# Patient Record
Sex: FEMALE | Race: BLACK OR AFRICAN AMERICAN | NOT HISPANIC OR LATINO | Employment: UNEMPLOYED | URBAN - METROPOLITAN AREA
[De-identification: names, ages, dates, MRNs, and addresses within clinical notes are randomized per-mention and may not be internally consistent; named-entity substitution may affect disease eponyms.]

---

## 2023-03-27 ENCOUNTER — TELEPHONE (OUTPATIENT)
Dept: PRIMARY CARE | Facility: CLINIC | Age: 33
End: 2023-03-27
Payer: COMMERCIAL

## 2023-03-27 NOTE — TELEPHONE ENCOUNTER
Patient states Zoloft may need dose increased as she is still anxious. Also wants you to know it is curbing her appetite where she is not eating enough for herself and breastfeeding.   Calm

## 2023-03-29 PROBLEM — R87.610 ASCUS WITH POSITIVE HIGH RISK HPV CERVICAL: Status: ACTIVE | Noted: 2023-03-29

## 2023-03-29 PROBLEM — R00.2 PALPITATIONS: Status: RESOLVED | Noted: 2023-03-29 | Resolved: 2023-03-29

## 2023-03-29 PROBLEM — R14.0 BLOATING: Status: RESOLVED | Noted: 2023-03-29 | Resolved: 2023-03-29

## 2023-03-29 PROBLEM — N72 HIGH RISK HUMAN PAPILLOMA VIRUS (HPV) INFECTION OF CERVIX: Status: ACTIVE | Noted: 2023-03-29

## 2023-03-29 PROBLEM — R87.810 PAPANICOLAOU SMEAR OF CERVIX WITH POSITIVE HIGH RISK HUMAN PAPILLOMA VIRUS (HPV) TEST: Status: ACTIVE | Noted: 2023-03-29

## 2023-03-29 PROBLEM — B37.31 VAGINITIS DUE TO CANDIDA: Status: RESOLVED | Noted: 2023-03-29 | Resolved: 2023-03-29

## 2023-03-29 PROBLEM — K59.00 CONSTIPATION, ACUTE: Status: RESOLVED | Noted: 2023-03-29 | Resolved: 2023-03-29

## 2023-03-29 PROBLEM — R87.810 ASCUS WITH POSITIVE HIGH RISK HPV CERVICAL: Status: RESOLVED | Noted: 2023-03-29 | Resolved: 2023-03-29

## 2023-03-29 PROBLEM — R87.810 ASCUS WITH POSITIVE HIGH RISK HPV CERVICAL: Status: ACTIVE | Noted: 2023-03-29

## 2023-03-29 PROBLEM — F41.9 ANXIETY AND DEPRESSION: Status: ACTIVE | Noted: 2023-03-29

## 2023-03-29 PROBLEM — B96.89 BACTERIAL VAGINOSIS: Status: RESOLVED | Noted: 2023-03-29 | Resolved: 2023-03-29

## 2023-03-29 PROBLEM — F17.210 CIGARETTE SMOKER: Status: ACTIVE | Noted: 2023-03-29

## 2023-03-29 PROBLEM — N39.0 URINARY TRACT INFECTION: Status: RESOLVED | Noted: 2023-03-29 | Resolved: 2023-03-29

## 2023-03-29 PROBLEM — N76.0 BACTERIAL VAGINOSIS: Status: RESOLVED | Noted: 2023-03-29 | Resolved: 2023-03-29

## 2023-03-29 PROBLEM — T63.481A ALLERGIC REACTION TO INSECT STING: Status: RESOLVED | Noted: 2023-03-29 | Resolved: 2023-03-29

## 2023-03-29 PROBLEM — F32.A ANXIETY AND DEPRESSION: Status: ACTIVE | Noted: 2023-03-29

## 2023-03-29 PROBLEM — N89.8 LEUKORRHEA: Status: RESOLVED | Noted: 2023-03-29 | Resolved: 2023-03-29

## 2023-03-29 PROBLEM — R87.612 LOW GRADE SQUAMOUS INTRAEPITHELIAL LESION (LGSIL) ON CERVICAL PAP SMEAR: Status: ACTIVE | Noted: 2023-03-29

## 2023-03-29 PROBLEM — R87.610 ASCUS WITH POSITIVE HIGH RISK HPV CERVICAL: Status: RESOLVED | Noted: 2023-03-29 | Resolved: 2023-03-29

## 2023-03-29 PROBLEM — B97.7 HIGH RISK HUMAN PAPILLOMA VIRUS (HPV) INFECTION OF CERVIX: Status: ACTIVE | Noted: 2023-03-29

## 2023-03-29 PROBLEM — R51.9 BILATERAL HEADACHES: Status: RESOLVED | Noted: 2023-03-29 | Resolved: 2023-03-29

## 2023-03-29 RX ORDER — CLINDAMYCIN PHOSPHATE 10 UG/ML
1 LOTION TOPICAL 2 TIMES DAILY
COMMUNITY
Start: 2022-12-14

## 2023-03-29 RX ORDER — TRIAMCINOLONE ACETONIDE 1 MG/G
1 OINTMENT TOPICAL 2 TIMES WEEKLY
COMMUNITY
Start: 2022-12-13

## 2023-03-29 RX ORDER — POLYETHYLENE GLYCOL 3350 17 G/17G
17 POWDER, FOR SOLUTION ORAL DAILY PRN
COMMUNITY
Start: 2021-10-13 | End: 2023-05-03 | Stop reason: ALTCHOICE

## 2023-03-29 RX ORDER — SERTRALINE HYDROCHLORIDE 50 MG/1
1 TABLET, FILM COATED ORAL DAILY
COMMUNITY
Start: 2023-03-01 | End: 2023-04-03 | Stop reason: DRUGHIGH

## 2023-03-30 ENCOUNTER — APPOINTMENT (OUTPATIENT)
Dept: PRIMARY CARE | Facility: CLINIC | Age: 33
End: 2023-03-30
Payer: COMMERCIAL

## 2023-04-02 NOTE — PROGRESS NOTES
Subjective   Patient ID: Griselda Little is a 32 y.o. female who presents for Follow-up (Follow up on medication.).    Here for 4 week follow up on Anxiety and depression. Last seen 3/1/23. Increased Sertraline to 50 mg daily.   Here for reevaluation. Patient is breastfeeding. Hair is coming out. She is getting headaches. Has had them for the last week or so. Baby is 1 yr old. No rapid heartbeats or diarrhea. She is having more issues with anxiety, gets upset easily, worrying about stuff that is unlikely. It gets out of control fast. Is still breastfeeding. She is weaning off currently. She would like to try going up first on the Sertraline first. No suicidal thoughts.   Weight 115 in Jan, 108 in Feb. Normal baseline weight is 120. Had TFTs in January.     Non smoker, never did.     Has had issues with bladder since delivered last year. Feels like she has to urinate all the time, but does not empty all the way. She will have a BM every 3 days then seems like her bladder works better for a day or so. Feels like neither bladder nor bowel have worked right since delivery one year ago. Had vaginal delivery without perineal laceration.              Current Outpatient Medications:     clindamycin (Cleocin T) 1 % lotion, Apply 1 Application topically in the morning and 1 Application before bedtime., Disp: , Rfl:     polyethylene glycol (Glycolax) 17 gram/dose powder, Take 17 g by mouth once daily as needed., Disp: , Rfl:     sertraline (Zoloft) 50 mg tablet, Take 1 tablet (50 mg) by mouth once daily., Disp: , Rfl:     triamcinolone (Kenalog) 0.1 % ointment, Apply 1 Application topically 2 times a week. To scalp, Disp: , Rfl:     Patient Active Problem List   Diagnosis    High risk human papilloma virus (HPV) infection of cervix    Low grade squamous intraepithelial lesion (LGSIL) on cervical Pap smear    Papanicolaou smear of cervix with positive high risk human papilloma virus (HPV) test    Anxiety and depression    Urgency  "incontinence         Review of Systems   Neurological:  Negative for dizziness.   Psychiatric/Behavioral:  Positive for decreased concentration and sleep disturbance. Negative for agitation, behavioral problems and suicidal ideas. The patient is nervous/anxious.        Objective   /78 (BP Location: Left arm, Patient Position: Sitting, BP Cuff Size: Adult)   Pulse 96   Temp 36.4 °C (97.6 °F)   Ht 1.575 m (5' 2\")   Wt 47.9 kg (105 lb 9.6 oz)   SpO2 96%   BMI 19.31 kg/m²     Physical Exam  Vitals reviewed.   Constitutional:       Appearance: Normal appearance.   Skin:     General: Skin is warm and dry.   Neurological:      Mental Status: She is alert and oriented to person, place, and time.   Psychiatric:         Behavior: Behavior normal.         Thought Content: Thought content normal.         Judgment: Judgment normal.      Comments: Mildly anxious. She is having no flight of ideas.          Assessment/Plan   Problem List Items Addressed This Visit          Genitourinary    Urgency incontinence - Primary     Difficulty voiding alternating with urge incontinence since delivered one year ago. Referred to Dr. Davis.          Relevant Orders    Referral to Urogynecology         Assessment, plans, tests, and follow up discussed with patient and patient verbalized understanding. Griselda was given an opportunity to ask questions and  any concerns were addressed including but not limited to referral, med changes..  "

## 2023-04-03 ENCOUNTER — OFFICE VISIT (OUTPATIENT)
Dept: PRIMARY CARE | Facility: CLINIC | Age: 33
End: 2023-04-03
Payer: COMMERCIAL

## 2023-04-03 VITALS
TEMPERATURE: 97.6 F | HEART RATE: 96 BPM | BODY MASS INDEX: 19.43 KG/M2 | DIASTOLIC BLOOD PRESSURE: 78 MMHG | OXYGEN SATURATION: 96 % | SYSTOLIC BLOOD PRESSURE: 110 MMHG | HEIGHT: 62 IN | WEIGHT: 105.6 LBS

## 2023-04-03 DIAGNOSIS — F32.A ANXIETY AND DEPRESSION: Primary | ICD-10-CM

## 2023-04-03 DIAGNOSIS — N39.41 URGENCY INCONTINENCE: ICD-10-CM

## 2023-04-03 DIAGNOSIS — F41.9 ANXIETY AND DEPRESSION: Primary | ICD-10-CM

## 2023-04-03 PROCEDURE — 99214 OFFICE O/P EST MOD 30 MIN: CPT | Performed by: FAMILY MEDICINE

## 2023-04-03 RX ORDER — SERTRALINE HYDROCHLORIDE 100 MG/1
100 TABLET, FILM COATED ORAL DAILY
Qty: 30 TABLET | Refills: 1 | Status: SHIPPED | OUTPATIENT
Start: 2023-04-03 | End: 2023-05-03 | Stop reason: SDUPTHER

## 2023-04-03 ASSESSMENT — ANXIETY QUESTIONNAIRES
7. FEELING AFRAID AS IF SOMETHING AWFUL MIGHT HAPPEN: SEVERAL DAYS
6. BECOMING EASILY ANNOYED OR IRRITABLE: NOT AT ALL
5. BEING SO RESTLESS THAT IT IS HARD TO SIT STILL: MORE THAN HALF THE DAYS
4. TROUBLE RELAXING: NEARLY EVERY DAY
3. WORRYING TOO MUCH ABOUT DIFFERENT THINGS: MORE THAN HALF THE DAYS
2. NOT BEING ABLE TO STOP OR CONTROL WORRYING: MORE THAN HALF THE DAYS
1. FEELING NERVOUS, ANXIOUS, OR ON EDGE: SEVERAL DAYS
IF YOU CHECKED OFF ANY PROBLEMS ON THIS QUESTIONNAIRE, HOW DIFFICULT HAVE THESE PROBLEMS MADE IT FOR YOU TO DO YOUR WORK, TAKE CARE OF THINGS AT HOME, OR GET ALONG WITH OTHER PEOPLE: SOMEWHAT DIFFICULT
GAD7 TOTAL SCORE: 11

## 2023-04-03 ASSESSMENT — ENCOUNTER SYMPTOMS
AGITATION: 0
DECREASED CONCENTRATION: 1
DIZZINESS: 0
NERVOUS/ANXIOUS: 1
SLEEP DISTURBANCE: 1
DEPRESSION: 0
OCCASIONAL FEELINGS OF UNSTEADINESS: 0
LOSS OF SENSATION IN FEET: 0

## 2023-04-03 ASSESSMENT — COLUMBIA-SUICIDE SEVERITY RATING SCALE - C-SSRS
1. IN THE PAST MONTH, HAVE YOU WISHED YOU WERE DEAD OR WISHED YOU COULD GO TO SLEEP AND NOT WAKE UP?: NO
6. HAVE YOU EVER DONE ANYTHING, STARTED TO DO ANYTHING, OR PREPARED TO DO ANYTHING TO END YOUR LIFE?: NO
2. HAVE YOU ACTUALLY HAD ANY THOUGHTS OF KILLING YOURSELF?: NO

## 2023-04-03 ASSESSMENT — PATIENT HEALTH QUESTIONNAIRE - PHQ9
1. LITTLE INTEREST OR PLEASURE IN DOING THINGS: NOT AT ALL
SUM OF ALL RESPONSES TO PHQ9 QUESTIONS 1 AND 2: 0
2. FEELING DOWN, DEPRESSED OR HOPELESS: NOT AT ALL

## 2023-04-03 NOTE — ASSESSMENT & PLAN NOTE
Difficulty voiding alternating with urge incontinence since delivered one year ago. Referred to Dr. Davis.

## 2023-04-03 NOTE — ASSESSMENT & PLAN NOTE
Increased anxiety. Still Breastfeeding. Despite weight loss concerns, she would like to try increasing Sertraline as not totally weaned baby yet. Will increase. Follow up one months. If still losing weight, may need GI evaluation

## 2023-05-03 ENCOUNTER — OFFICE VISIT (OUTPATIENT)
Dept: PRIMARY CARE | Facility: CLINIC | Age: 33
End: 2023-05-03
Payer: COMMERCIAL

## 2023-05-03 VITALS
TEMPERATURE: 98 F | OXYGEN SATURATION: 98 % | HEIGHT: 62 IN | BODY MASS INDEX: 19.91 KG/M2 | HEART RATE: 96 BPM | RESPIRATION RATE: 20 BRPM | WEIGHT: 108.2 LBS | DIASTOLIC BLOOD PRESSURE: 69 MMHG | SYSTOLIC BLOOD PRESSURE: 100 MMHG

## 2023-05-03 DIAGNOSIS — N39.41 URGENCY INCONTINENCE: ICD-10-CM

## 2023-05-03 DIAGNOSIS — F41.9 ANXIETY AND DEPRESSION: ICD-10-CM

## 2023-05-03 DIAGNOSIS — N72 HIGH RISK HUMAN PAPILLOMA VIRUS (HPV) INFECTION OF CERVIX: ICD-10-CM

## 2023-05-03 DIAGNOSIS — Z11.59 ENCOUNTER FOR HEPATITIS C SCREENING TEST FOR LOW RISK PATIENT: ICD-10-CM

## 2023-05-03 DIAGNOSIS — Z00.00 WELL ADULT EXAM: Primary | ICD-10-CM

## 2023-05-03 DIAGNOSIS — B97.7 HIGH RISK HUMAN PAPILLOMA VIRUS (HPV) INFECTION OF CERVIX: ICD-10-CM

## 2023-05-03 DIAGNOSIS — R87.612 LOW GRADE SQUAMOUS INTRAEPITHELIAL LESION (LGSIL) ON CERVICAL PAP SMEAR: ICD-10-CM

## 2023-05-03 DIAGNOSIS — F32.A ANXIETY AND DEPRESSION: ICD-10-CM

## 2023-05-03 DIAGNOSIS — Z13.1 SCREENING FOR DIABETES MELLITUS: ICD-10-CM

## 2023-05-03 DIAGNOSIS — Z23 NEED FOR VACCINATION: ICD-10-CM

## 2023-05-03 DIAGNOSIS — Z13.220 SCREENING, LIPID: ICD-10-CM

## 2023-05-03 DIAGNOSIS — R63.4 WEIGHT LOSS: ICD-10-CM

## 2023-05-03 PROCEDURE — 99395 PREV VISIT EST AGE 18-39: CPT | Performed by: FAMILY MEDICINE

## 2023-05-03 PROCEDURE — 99213 OFFICE O/P EST LOW 20 MIN: CPT | Performed by: FAMILY MEDICINE

## 2023-05-03 RX ORDER — KETOCONAZOLE 20 MG/ML
SHAMPOO, SUSPENSION TOPICAL
COMMUNITY
Start: 2023-04-19

## 2023-05-03 RX ORDER — SERTRALINE HYDROCHLORIDE 100 MG/1
100 TABLET, FILM COATED ORAL DAILY
Qty: 90 TABLET | Refills: 3 | Status: SHIPPED | OUTPATIENT
Start: 2023-05-03 | End: 2023-08-04 | Stop reason: SDUPTHER

## 2023-05-03 ASSESSMENT — ENCOUNTER SYMPTOMS
APPETITE CHANGE: 0
NERVOUS/ANXIOUS: 0
POLYDIPSIA: 0
TROUBLE SWALLOWING: 0
HEADACHES: 0
WHEEZING: 0
ABDOMINAL PAIN: 0
CONSTIPATION: 0
DIFFICULTY URINATING: 0
NAUSEA: 0
SEIZURES: 0
CONFUSION: 0
COUGH: 0
BLOOD IN STOOL: 0
SHORTNESS OF BREATH: 0
ACTIVITY CHANGE: 0
DYSPHORIC MOOD: 0
FATIGUE: 0
UNEXPECTED WEIGHT CHANGE: 0
PALPITATIONS: 0
ARTHRALGIAS: 0
POLYPHAGIA: 0
VOMITING: 0
DIARRHEA: 0
JOINT SWELLING: 0

## 2023-05-03 NOTE — ASSESSMENT & PLAN NOTE
TFTs were normal. Increased Sertraline to see if controlling depression would help. If weight loss continues, will need to see GI.

## 2023-05-03 NOTE — ASSESSMENT & PLAN NOTE
Sertraline increased to 100 mg daily on  4/3/23. Concentration and motivation have increased. Continue Sertraline 100 mg daily. Recheck 6 months.

## 2023-05-03 NOTE — ASSESSMENT & PLAN NOTE
On Pap July 2022. Colpo August 2022 was negative. Repeat Pap/HPV due Aug 2022. Is scheduled with Dr. Pal

## 2023-05-03 NOTE — PROGRESS NOTES
Subjective   Patient ID: Griselda Little is a 32 y.o. female who presents for Annual Exam (1 month follow up).     HPI    Here for physical and to follow up from visit one month ago. Was seen for anx/depression, weight loss and urge incontinence issues    Anxiety/depression - was still having issues with concentration and motivation on Sertraline 50 mg daily. Increased to 100 mg last visit. Is doing better and symptoms and function have improved. No side effects. No SI, hallucinations or delusions. No agitation. Sleep ok. She ie eating more sugary foods and wonders if it could be a symptom. Mind is racing at night still and does not settle down. She is thinking maybe changing habits will help. She has not totally weaned her daughter and it makes her anxious. She is able to interact better with others and is less edgy in social setting.     Weight loss - had lost 10 lb unintentionally since Aug 2022. Weight 105 last month. Was to follow up to see if weight stabilized with mood. TFTs done January 2023.     Urge Incontinence - sx started after deivery one year prior and also having some fecal issues. Referred to Dr. Davis, has appt scheduled on 5/9/23.  She is having less problem than before but still is issue. Has some mucous when has BM.     LSIL with positive HRHPV. - July 2022, colpo Aug 2022. Dr. Pal. Is scheduled for repeat July 2023.     Has had HPV immunizations times 2 at age 21, due for number 3, [facility].    Here for annual wellness exam. Last wellness unknown.     New concerns:no  Feels: well    Changes  to health/medications since last visit No   Other providers seen since last visit none  Periods: not done breast feeding completely. LMP Feb 2023, denies pregnancy  Contraception:no    Healthy lifestyle habits: Regular exercise: not a smuch lately.                                         Dietary habits: healthy diet                                         Social connections/relationships good                                        Wears seatbelts Yes                                         Sunscreen Yes                                         Sexual Risk Factors No        Health screenings:  Pap smear: 7/18/22 HPV pos, followed by colpo repeat due July 2023                                  Mammogram not applicable                                  Self-breast Exam No                                   Colon screening not applicable                                  Dexa not applicable                                  Hep C screening No                                   HIV screening  9/14/2021                                  STI screening March 2022                          Health risks: Domestic Violence no                      Work-life balance Yes  - just got a job last week as a .                       Smoke detectors Yes                       Carbon monoxide detectors yes                      Gun safety not applicable                      Second-hand Smoke No                       Occupational Risks no    Immunizations:  Influenza:  No                             Tdap:  12/23/21                            HPV:  times 2, due for number 3                           Pneumonia: not applicable                           Shingrix: not applicable                           COVID: No           Current Outpatient Medications:     clindamycin (Cleocin T) 1 % lotion, Apply 1 Application topically 2 times a day., Disp: , Rfl:     ketoconazole (NIZOral) 2 % shampoo, apply to scalp every week LEAVE ON SCALP FOR 10-15 MINUTES THEN R...  (REFER TO PRESCRIPTION NOTES)., Disp: , Rfl:     sertraline (Zoloft) 100 mg tablet, Take 1 tablet (100 mg) by mouth once daily., Disp: 30 tablet, Rfl: 1    triamcinolone (Kenalog) 0.1 % ointment, Apply 1 Application topically 2 times a week. To scalp, Disp: , Rfl:     Patient Active Problem List   Diagnosis    High risk human papilloma virus  "(HPV) infection of cervix    Low grade squamous intraepithelial lesion (LGSIL) on cervical Pap smear    Anxiety and depression    Urgency incontinence    Weight loss         Review of Systems   Constitutional:  Negative for activity change, appetite change, fatigue and unexpected weight change.   HENT:  Negative for dental problem, hearing loss and trouble swallowing.    Eyes:  Negative for visual disturbance.   Respiratory:  Negative for cough, shortness of breath and wheezing.    Cardiovascular:  Negative for chest pain, palpitations and leg swelling.   Gastrointestinal:  Negative for abdominal pain, blood in stool, constipation, diarrhea, nausea and vomiting.   Endocrine: Negative for cold intolerance, heat intolerance, polydipsia, polyphagia and polyuria.   Genitourinary:  Negative for difficulty urinating and menstrual problem.   Musculoskeletal:  Negative for arthralgias and joint swelling.   Neurological:  Negative for seizures, syncope and headaches.   Psychiatric/Behavioral:  Negative for confusion and dysphoric mood. The patient is not nervous/anxious.        Objective   /69   Pulse 96   Temp 36.7 °C (98 °F)   Resp 20   Ht 1.575 m (5' 2\")   Wt 49.1 kg (108 lb 3.2 oz)   SpO2 98%   BMI 19.79 kg/m²     Physical Exam  Constitutional:       Appearance: Normal appearance.   HENT:      Head: Normocephalic and atraumatic.      Right Ear: Tympanic membrane normal.      Left Ear: Tympanic membrane normal.      Nose: Nose normal.      Mouth/Throat:      Pharynx: Oropharynx is clear.   Eyes:      Extraocular Movements: Extraocular movements intact.      Conjunctiva/sclera: Conjunctivae normal.      Pupils: Pupils are equal, round, and reactive to light.   Neck:      Vascular: No carotid bruit.   Cardiovascular:      Rate and Rhythm: Normal rate and regular rhythm.      Pulses: Normal pulses.      Heart sounds: No murmur heard.  Pulmonary:      Effort: Pulmonary effort is normal.      Breath sounds: Normal " breath sounds.   Abdominal:      Palpations: There is no hepatomegaly, splenomegaly or mass.      Tenderness: There is no abdominal tenderness.   Musculoskeletal:         General: Normal range of motion.      Cervical back: Normal range of motion.      Left lower leg: No edema.   Skin:     General: Skin is warm and dry.      Findings: No rash.   Neurological:      General: No focal deficit present.      Mental Status: She is alert. Mental status is at baseline.      Gait: Gait is intact.   Psychiatric:         Mood and Affect: Mood normal.         Thought Content: Thought content normal.       Assessment/Plan   Problem List Items Addressed This Visit          Genitourinary    High risk human papilloma virus (HPV) infection of cervix     On Pap July 2022. Colpo August 2022 was negative. Repeat Pap/HPV due Aug 2022. Is scheduled with Dr. Pal         Urgency incontinence     Issues with urine and stool since delivery. Has appt with urogynecology Dr. Davis on 5/9/23            Endocrine/Metabolic    Weight loss     TFTs were normal. Increased Sertraline to see if controlling depression would help. If weight loss continues, will need to see GI.          Relevant Orders    Comprehensive Metabolic Panel    CBC and Auto Differential       Other    Low grade squamous intraepithelial lesion (LGSIL) on cervical Pap smear     Scheduled for repeat Pap Dr. Pal July 2023.          Anxiety and depression     Sertraline increased to 100 mg daily on  4/3/23. Concentration and motivation have increased. Continue Sertraline 100 mg daily. Recheck 6 months.           Other Visit Diagnoses       Well adult exam    -  Primary    Discussed prevention from age, healthy lifestyle, immunizations.    Encounter for hepatitis C screening test for low risk patient        ordered    Relevant Orders    Hepatitis C Antibody    Need for vaccination        Flu shot in fall, Needs HPV3, needs to get at pharmacy per insurance.    Screening for  diabetes mellitus        Relevant Orders    Hemoglobin A1C    Screening, lipid        Relevant Orders    Lipid Panel              Assessment, plans, tests, and follow up discussed with patient and patient verbalized understanding. Griselda was given an opportunity to ask questions and  any concerns were addressed including but not limited to medications, follow up .

## 2023-08-03 ENCOUNTER — APPOINTMENT (OUTPATIENT)
Dept: PRIMARY CARE | Facility: CLINIC | Age: 33
End: 2023-08-03
Payer: COMMERCIAL

## 2023-08-04 ENCOUNTER — OFFICE VISIT (OUTPATIENT)
Dept: PRIMARY CARE | Facility: CLINIC | Age: 33
End: 2023-08-04
Payer: COMMERCIAL

## 2023-08-04 ENCOUNTER — LAB (OUTPATIENT)
Dept: LAB | Facility: LAB | Age: 33
End: 2023-08-04
Payer: COMMERCIAL

## 2023-08-04 VITALS
HEART RATE: 74 BPM | DIASTOLIC BLOOD PRESSURE: 70 MMHG | HEIGHT: 62 IN | RESPIRATION RATE: 16 BRPM | WEIGHT: 112.4 LBS | TEMPERATURE: 98.2 F | OXYGEN SATURATION: 97 % | SYSTOLIC BLOOD PRESSURE: 104 MMHG | BODY MASS INDEX: 20.68 KG/M2

## 2023-08-04 DIAGNOSIS — F32.A ANXIETY AND DEPRESSION: Primary | ICD-10-CM

## 2023-08-04 DIAGNOSIS — Z13.1 SCREENING FOR DIABETES MELLITUS: ICD-10-CM

## 2023-08-04 DIAGNOSIS — R63.4 WEIGHT LOSS: ICD-10-CM

## 2023-08-04 DIAGNOSIS — F41.9 ANXIETY AND DEPRESSION: Primary | ICD-10-CM

## 2023-08-04 DIAGNOSIS — Z11.59 ENCOUNTER FOR HEPATITIS C SCREENING TEST FOR LOW RISK PATIENT: ICD-10-CM

## 2023-08-04 DIAGNOSIS — Z13.220 SCREENING, LIPID: ICD-10-CM

## 2023-08-04 LAB
ALANINE AMINOTRANSFERASE (SGPT) (U/L) IN SER/PLAS: 20 U/L (ref 7–45)
ALBUMIN (G/DL) IN SER/PLAS: 4.2 G/DL (ref 3.4–5)
ALKALINE PHOSPHATASE (U/L) IN SER/PLAS: 65 U/L (ref 33–110)
ANION GAP IN SER/PLAS: 9 MMOL/L (ref 10–20)
ASPARTATE AMINOTRANSFERASE (SGOT) (U/L) IN SER/PLAS: 19 U/L (ref 9–39)
BASOPHILS (10*3/UL) IN BLOOD BY AUTOMATED COUNT: 0.06 X10E9/L (ref 0–0.1)
BASOPHILS/100 LEUKOCYTES IN BLOOD BY AUTOMATED COUNT: 1.3 % (ref 0–2)
BILIRUBIN TOTAL (MG/DL) IN SER/PLAS: 0.9 MG/DL (ref 0–1.2)
CALCIUM (MG/DL) IN SER/PLAS: 8.9 MG/DL (ref 8.6–10.3)
CARBON DIOXIDE, TOTAL (MMOL/L) IN SER/PLAS: 28 MMOL/L (ref 21–32)
CHLORIDE (MMOL/L) IN SER/PLAS: 107 MMOL/L (ref 98–107)
CHOLESTEROL (MG/DL) IN SER/PLAS: 164 MG/DL (ref 0–199)
CHOLESTEROL IN HDL (MG/DL) IN SER/PLAS: 66.4 MG/DL
CHOLESTEROL/HDL RATIO: 2.5
CREATININE (MG/DL) IN SER/PLAS: 0.6 MG/DL (ref 0.5–1.05)
EOSINOPHILS (10*3/UL) IN BLOOD BY AUTOMATED COUNT: 0.13 X10E9/L (ref 0–0.7)
EOSINOPHILS/100 LEUKOCYTES IN BLOOD BY AUTOMATED COUNT: 2.8 % (ref 0–6)
ERYTHROCYTE DISTRIBUTION WIDTH (RATIO) BY AUTOMATED COUNT: 12.1 % (ref 11.5–14.5)
ERYTHROCYTE MEAN CORPUSCULAR HEMOGLOBIN CONCENTRATION (G/DL) BY AUTOMATED: 32.3 G/DL (ref 32–36)
ERYTHROCYTE MEAN CORPUSCULAR VOLUME (FL) BY AUTOMATED COUNT: 90 FL (ref 80–100)
ERYTHROCYTES (10*6/UL) IN BLOOD BY AUTOMATED COUNT: 4.43 X10E12/L (ref 4–5.2)
ESTIMATED AVERAGE GLUCOSE FOR HBA1C: 105 MG/DL
GFR FEMALE: >90 ML/MIN/1.73M2
GLUCOSE (MG/DL) IN SER/PLAS: 83 MG/DL (ref 74–99)
HEMATOCRIT (%) IN BLOOD BY AUTOMATED COUNT: 39.9 % (ref 36–46)
HEMOGLOBIN (G/DL) IN BLOOD: 12.9 G/DL (ref 12–16)
HEMOGLOBIN A1C/HEMOGLOBIN TOTAL IN BLOOD: 5.3 %
HEPATITIS C VIRUS AB PRESENCE IN SERUM: NONREACTIVE
IMMATURE GRANULOCYTES/100 LEUKOCYTES IN BLOOD BY AUTOMATED COUNT: 0.2 % (ref 0–0.9)
LDL: 87 MG/DL (ref 0–99)
LEUKOCYTES (10*3/UL) IN BLOOD BY AUTOMATED COUNT: 4.7 X10E9/L (ref 4.4–11.3)
LYMPHOCYTES (10*3/UL) IN BLOOD BY AUTOMATED COUNT: 2.47 X10E9/L (ref 1.2–4.8)
LYMPHOCYTES/100 LEUKOCYTES IN BLOOD BY AUTOMATED COUNT: 52.9 % (ref 13–44)
MONOCYTES (10*3/UL) IN BLOOD BY AUTOMATED COUNT: 0.32 X10E9/L (ref 0.1–1)
MONOCYTES/100 LEUKOCYTES IN BLOOD BY AUTOMATED COUNT: 6.9 % (ref 2–10)
NEUTROPHILS (10*3/UL) IN BLOOD BY AUTOMATED COUNT: 1.68 X10E9/L (ref 1.2–7.7)
NEUTROPHILS/100 LEUKOCYTES IN BLOOD BY AUTOMATED COUNT: 35.9 % (ref 40–80)
PLATELETS (10*3/UL) IN BLOOD AUTOMATED COUNT: 258 X10E9/L (ref 150–450)
POTASSIUM (MMOL/L) IN SER/PLAS: 4.1 MMOL/L (ref 3.5–5.3)
PROTEIN TOTAL: 6.6 G/DL (ref 6.4–8.2)
SODIUM (MMOL/L) IN SER/PLAS: 140 MMOL/L (ref 136–145)
TRIGLYCERIDE (MG/DL) IN SER/PLAS: 51 MG/DL (ref 0–149)
UREA NITROGEN (MG/DL) IN SER/PLAS: 12 MG/DL (ref 6–23)
VLDL: 10 MG/DL (ref 0–40)

## 2023-08-04 PROCEDURE — 80061 LIPID PANEL: CPT

## 2023-08-04 PROCEDURE — 83036 HEMOGLOBIN GLYCOSYLATED A1C: CPT

## 2023-08-04 PROCEDURE — 99213 OFFICE O/P EST LOW 20 MIN: CPT | Performed by: FAMILY MEDICINE

## 2023-08-04 PROCEDURE — 36415 COLL VENOUS BLD VENIPUNCTURE: CPT

## 2023-08-04 PROCEDURE — 86803 HEPATITIS C AB TEST: CPT

## 2023-08-04 PROCEDURE — 85025 COMPLETE CBC W/AUTO DIFF WBC: CPT

## 2023-08-04 PROCEDURE — 80053 COMPREHEN METABOLIC PANEL: CPT

## 2023-08-04 RX ORDER — SERTRALINE HYDROCHLORIDE 100 MG/1
150 TABLET, FILM COATED ORAL DAILY
Qty: 135 TABLET | Refills: 3 | Status: SHIPPED | OUTPATIENT
Start: 2023-08-04 | End: 2024-08-03

## 2023-08-04 ASSESSMENT — COLUMBIA-SUICIDE SEVERITY RATING SCALE - C-SSRS
2. HAVE YOU ACTUALLY HAD ANY THOUGHTS OF KILLING YOURSELF?: NO
1. IN THE PAST MONTH, HAVE YOU WISHED YOU WERE DEAD OR WISHED YOU COULD GO TO SLEEP AND NOT WAKE UP?: NO
6. HAVE YOU EVER DONE ANYTHING, STARTED TO DO ANYTHING, OR PREPARED TO DO ANYTHING TO END YOUR LIFE?: NO

## 2023-08-04 ASSESSMENT — ENCOUNTER SYMPTOMS
DEPRESSION: 0
LOSS OF SENSATION IN FEET: 0
OCCASIONAL FEELINGS OF UNSTEADINESS: 0

## 2023-08-04 NOTE — PATIENT INSTRUCTIONS
Increased Sertraline to 150 mg daily.     Follow up in 3 weeks. Bring baby shot records.     Get IEP info and check with your school to see what the requirements are for IEP.

## 2023-08-04 NOTE — ASSESSMENT & PLAN NOTE
Increased it to 100 and helped some. She still has trouble going to sleep at night. Wonders if needs higher. Mind will not shut down at night. Is still Breastfeeding but trying to wean off.

## 2023-08-04 NOTE — PROGRESS NOTES
"Subjective   Patient ID: Griselda Little is a 32 y.o. female who presents for Follow-up.    Here for follow up. Sertaline 100 mg helped some but feels needs higher dose due to thoughts racing at night. 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   No SI. Depression is improved but intermittently stressed by money issues.     She had IEP in school and wants to know if can do that. She does not know what the school requires.     Had labs done today.            Current Outpatient Medications:     clindamycin (Cleocin T) 1 % lotion, Apply 1 Application topically 2 times a day., Disp: , Rfl:     ketoconazole (NIZOral) 2 % shampoo, apply to scalp every week LEAVE ON SCALP FOR 10-15 MINUTES THEN R...  (REFER TO PRESCRIPTION NOTES)., Disp: , Rfl:     triamcinolone (Kenalog) 0.1 % ointment, Apply 1 Application topically 2 times a week. To scalp, Disp: , Rfl:     sertraline (Zoloft) 100 mg tablet, Take 1.5 tablets (150 mg) by mouth once daily., Disp: 135 tablet, Rfl: 3    Patient Active Problem List   Diagnosis    High risk human papilloma virus (HPV) infection of cervix    Low grade squamous intraepithelial lesion (LGSIL) on cervical Pap smear    Anxiety and depression    Urgency incontinence    Weight loss         Review of Systems    Objective   /70 (BP Location: Left arm, Patient Position: Sitting, BP Cuff Size: Adult)   Pulse 74   Temp 36.8 °C (98.2 °F)   Resp 16   Ht 1.575 m (5' 2\")   Wt 51 kg (112 lb 6.4 oz)   SpO2 97%   BMI 20.56 kg/m²     Physical Exam  Vitals reviewed.   Constitutional:       Appearance: Normal appearance. She is normal weight.   Pulmonary:      Effort: Pulmonary effort is normal.   Skin:     General: Skin is warm and dry.   Neurological:      Mental Status: She is alert.   Psychiatric:         Mood and Affect: Mood normal.         Behavior: Behavior normal.         Assessment/Plan   Problem List Items Addressed This Visit       Anxiety and depression - Primary     Increased it to " 100 and helped some. She still has trouble going to sleep at night. Wonders if needs higher. Mind will not shut down at night. Is still Breastfeeding but trying to wean off.          Relevant Medications    sertraline (Zoloft) 100 mg tablet    Weight loss     Weight up 4 lbs.               Assessment, plans, tests, and follow up discussed with patient and patient verbalized understanding. Griselda was given an opportunity to ask questions and  any concerns were addressed including but not limited to followup, need for immunization records, med changes. .

## 2023-08-09 ENCOUNTER — TELEPHONE (OUTPATIENT)
Dept: PRIMARY CARE | Facility: CLINIC | Age: 33
End: 2023-08-09
Payer: COMMERCIAL

## 2023-08-09 DIAGNOSIS — Z11.1 SCREENING EXAMINATION FOR PULMONARY TUBERCULOSIS: Primary | ICD-10-CM

## 2023-08-09 NOTE — TELEPHONE ENCOUNTER
Patient called in and she actually starts school on Monday and she needed a TB shot. Can we set that up as a nurse visit?

## 2023-08-11 NOTE — TELEPHONE ENCOUNTER
Left message with patient that she cannot get that here due to timing and availability. Suggested she go to  today so can get read on Sunday.   Also offered to do T-spot. Put in order in case she wants it. Would want to get drawn today to maybe be back on Monday.

## 2023-08-16 ENCOUNTER — TELEPHONE (OUTPATIENT)
Dept: PRIMARY CARE | Facility: CLINIC | Age: 33
End: 2023-08-16
Payer: COMMERCIAL

## 2023-08-17 ENCOUNTER — LAB (OUTPATIENT)
Dept: LAB | Facility: LAB | Age: 33
End: 2023-08-17
Payer: COMMERCIAL

## 2023-08-17 ENCOUNTER — TELEPHONE (OUTPATIENT)
Dept: PRIMARY CARE | Facility: CLINIC | Age: 33
End: 2023-08-17

## 2023-08-17 DIAGNOSIS — Z11.59 SCREENING FOR VIRAL DISEASE: Primary | ICD-10-CM

## 2023-08-17 DIAGNOSIS — Z11.1 SCREENING EXAMINATION FOR PULMONARY TUBERCULOSIS: ICD-10-CM

## 2023-08-17 NOTE — TELEPHONE ENCOUNTER
She needs MMR, Varicella and Hep B titers. They are ordered  Also, cannot do the PPD but the blood test for TB is accepatable per the forms she dropped off, and order in chart.   Forms will be completed once labs back.     She has another message about IEP letter. She was supposed to provide me with documentation for her ADD diagnosis. Does she have it?    Also, I cannot write a letter without knowing the exact accommodations she is rewuesting. She needs to find out what they want and if they have forms.

## 2023-08-17 NOTE — TELEPHONE ENCOUNTER
Patient is requesting a titers lab to be ordered so she is able to see what vaccinations she needs.

## 2023-08-22 ENCOUNTER — LAB (OUTPATIENT)
Dept: LAB | Facility: LAB | Age: 33
End: 2023-08-22
Payer: COMMERCIAL

## 2023-08-22 DIAGNOSIS — Z11.59 SCREENING FOR VIRAL DISEASE: ICD-10-CM

## 2023-08-22 PROCEDURE — 86706 HEP B SURFACE ANTIBODY: CPT

## 2023-08-22 PROCEDURE — 36415 COLL VENOUS BLD VENIPUNCTURE: CPT

## 2023-08-22 PROCEDURE — 86735 MUMPS ANTIBODY: CPT

## 2023-08-22 PROCEDURE — 86762 RUBELLA ANTIBODY: CPT

## 2023-08-22 PROCEDURE — 86765 RUBEOLA ANTIBODY: CPT

## 2023-08-22 PROCEDURE — 86787 VARICELLA-ZOSTER ANTIBODY: CPT

## 2023-08-23 LAB
HEPATITIS B VIRUS SURFACE AB (MIU/ML) IN SERUM: <3.1 MIU/ML
MUMPS IGG ANTIBODY: POSITIVE
RUBELLA VIRUS IGG AB: POSITIVE
RUBEOLA IGG ANTIBODY: POSITIVE
VARICELLA ZOSTER IGG: POSITIVE

## 2023-08-24 ENCOUNTER — TELEPHONE (OUTPATIENT)
Dept: PRIMARY CARE | Facility: CLINIC | Age: 33
End: 2023-08-24
Payer: COMMERCIAL

## 2023-08-25 ENCOUNTER — APPOINTMENT (OUTPATIENT)
Dept: PRIMARY CARE | Facility: CLINIC | Age: 33
End: 2023-08-25
Payer: COMMERCIAL

## 2023-08-25 RX ORDER — LORATADINE 10 MG/1
10 TABLET ORAL DAILY
COMMUNITY
Start: 2023-08-17

## 2023-08-25 ASSESSMENT — ENCOUNTER SYMPTOMS
AGITATION: 0
NERVOUS/ANXIOUS: 0
DECREASED CONCENTRATION: 0
HEADACHES: 0
DYSPHORIC MOOD: 0
NAUSEA: 0

## 2023-08-25 NOTE — PROGRESS NOTES
Subjective   Patient ID: Griselda Little is a 32 y.o. female who presents for No chief complaint on file..    HPI       Current Outpatient Medications:     loratadine (Claritin) 10 mg tablet, Take 1 tablet (10 mg) by mouth once daily., Disp: , Rfl:     clindamycin (Cleocin T) 1 % lotion, Apply 1 Application topically 2 times a day., Disp: , Rfl:     ketoconazole (NIZOral) 2 % shampoo, apply to scalp every week LEAVE ON SCALP FOR 10-15 MINUTES THEN R...  (REFER TO PRESCRIPTION NOTES)., Disp: , Rfl:     sertraline (Zoloft) 100 mg tablet, Take 1.5 tablets (150 mg) by mouth once daily., Disp: 135 tablet, Rfl: 3    triamcinolone (Kenalog) 0.1 % ointment, Apply 1 Application topically 2 times a week. To scalp, Disp: , Rfl:     Patient Active Problem List   Diagnosis    High risk human papilloma virus (HPV) infection of cervix    Low grade squamous intraepithelial lesion (LGSIL) on cervical Pap smear    Anxiety and depression    Urgency incontinence    Weight loss         Review of Systems   Gastrointestinal:  Negative for nausea.   Neurological:  Negative for headaches.   Psychiatric/Behavioral:  Negative for agitation, behavioral problems, decreased concentration and dysphoric mood. The patient is not nervous/anxious.        Objective   There were no vitals taken for this visit.    Physical Exam  Vitals reviewed.   Constitutional:       Appearance: Normal appearance.   Pulmonary:      Effort: Pulmonary effort is normal.   Neurological:      Mental Status: She is alert.   Psychiatric:         Mood and Affect: Mood normal.         Behavior: Behavior normal.       Recent Results (from the past 672 hour(s))   CYTOLOGY GYN RESULTS    Collection Time: 07/28/23 12:02 PM   Result Value Ref Range    Pathology Report           Accession #: [id number]     Date of Procedure:  7/28/2023       Pathologist: [facility], Cytology  Date Reported: 8/9/2023  Date Received:  7/28/2023  Submitting Physician: ELA  MD NATHAN                    FINAL CYTOLOGICAL INTERPRETATION        A.  THINPREP PAP CERVICAL:              Specimen adequacy:       SATISFACTORY FOR EVALUATION.       Quality Indicator: Endocervical/transformation zone component is present.              General Categorization:       NEGATIVE FOR INTRAEPITHELIAL LESION OR MALIGNANCY.                            HIGH RISK HPV TEST RESULT:                       HPV GENOTYPE  16                      NEGATIVE       HPV GENOTYPE  18                      NEGATIVE       HPV GENOTYPE  OTHER             NEGATIVE              Reference Range: Negative                  Slide(s) initially screened by a Cytotechnologist at [facility], [address] [address]  QC review performed at [facility], [place], operated by [facility], [address] [place] [address]  Testing for high-risk (HR) type of human papilloma virus (HPV) is performed by  the Roche yonny HPV Test.  The yonny HPV Test is a qualitative polymerase chain  reaction that amplifies DNA of HPV16, HPV18 and 12 other high-risk HPV types  (31, 33, 35, 39, 45, 51, 52, 56, 58, 59, 66, and 68) associated with cervical  cancer and its precursor lesions.  A positive result indicates the presence of  HPV DNA due to one or more of the 14 genotypes: 16, 18, 31, 33, 35, 39, 45, 51,  52, 56, 58, 59, 66, and 68. Negative results indicate HPV DNA concentrations  are undetectable or below the pre-set threshold for detection. False negative  results may be associated with unoptimized sampling. A negative HR HPV result  does not exclude the possibility of future cytologic HSIL or underlying CIN2-3  or cancer.    This test is approved for cervical specimens by  the [place] Food and Drug  Administration. Results of this test should be interprete d in conjunction with  the patient’s Pap test results.  Please refer to ASCCP current guidelines for  the use of HPV DNA testing, result interpretation,  and patient management.   The performance of this test was verified by the Molecular Diagnostic  Laboratory at [facility]. The lab is  certified under the Clinical Laboratory Amendments of 1988 (CLIA 88) as  qualified to perform high complexity clinical laboratory testing.    This specimen has been analyzed by the ScienionPrep Imaging System ([employer]  an automated imaging and review system, which assists the laboratory in  evaluating cells on ThinPrep Pap tests. Following automated imaging, selected  fields from every slide were reviewed by a cytotechnologist and/or pathologist.      Electronically Signed Out By [facility],  Cytology//MXG/SLD   By the signature on this report, the individual or group listed as making the  Final Interpretation/Diagnosis certifies that they ha ve reviewed this case.  Diagnostic interpretation performed at [address] [address]  Educational Note:  Cervical cytology is a screening procedure primarily for squamous cancers and  precursors and has associated false-negative and false-positive results as  evidenced by published data.  Your patient’s test should be interpreted in this  context, together with patient’s history and clinical findings.  Regular  sampling and follow-up of unexplained clinical signs and symptoms are  recommended to minimize false negative results.         Clinical History  Date of Last Menstrual Period:     unknown    Other Clinical Conditions:  HPV Test for All Interpretations - Include HPV Genotype    Clinical Diagnosis History: High risk HPV infection - (B97.7); Low grade  squamous intraepithelial lesion (LGSIL) on cervical Pap smear - (R87.612)   Source of Specimen  A: THINPREP PAP CERVICAL            [facility]  Department of  Pathology   [address] [place] [id number]       Hepatitis C Antibody    Collection Time:  08/04/23 11:01 AM   Result Value Ref Range    Hepatitis C Ab NONREACTIVE NONREACTIVE   Lipid Panel    Collection Time: 08/04/23 11:01 AM   Result Value Ref Range    Cholesterol 164 0 - 199 mg/dL    HDL 66.4 mg/dL    Cholesterol/HDL Ratio 2.5     LDL 87 0 - 99 mg/dL    VLDL 10 0 - 40 mg/dL    Triglycerides 51 0 - 149 mg/dL   Hemoglobin A1C    Collection Time: 08/04/23 11:01 AM   Result Value Ref Range    Hemoglobin A1C 5.3 %    Estimated Average Glucose 105 MG/DL   Comprehensive Metabolic Panel    Collection Time: 08/04/23 11:01 AM   Result Value Ref Range    Glucose 83 74 - 99 mg/dL    Sodium 140 136 - 145 mmol/L    Potassium 4.1 3.5 - 5.3 mmol/L    Chloride 107 98 - 107 mmol/L    Bicarbonate 28 21 - 32 mmol/L    Anion Gap 9 (L) 10 - 20 mmol/L    Urea Nitrogen 12 6 - 23 mg/dL    Creatinine 0.60 0.50 - 1.05 mg/dL    GFR Female >90 >90 mL/min/1.73m2    Calcium 8.9 8.6 - 10.3 mg/dL    Albumin 4.2 3.4 - 5.0 g/dL    Alkaline Phosphatase 65 33 - 110 U/L    Total Protein 6.6 6.4 - 8.2 g/dL    AST 19 9 - 39 U/L    Total Bilirubin 0.9 0.0 - 1.2 mg/dL    ALT (SGPT) 20 7 - 45 U/L   CBC and Auto Differential    Collection Time: 08/04/23 11:01 AM   Result Value Ref Range    WBC 4.7 4.4 - 11.3 x10E9/L    RBC 4.43 4.00 - 5.20 x10E12/L    Hemoglobin 12.9 12.0 - 16.0 g/dL    Hematocrit 39.9 36.0 - 46.0 %    MCV 90 80 - 100 fL    MCHC 32.3 32.0 - 36.0 g/dL    Platelets 258 150 - 450 x10E9/L    RDW 12.1 11.5 - 14.5 %    Neutrophils % 35.9 40.0 - 80.0 %    Immature Granulocytes %, Automated 0.2 0.0 - 0.9 %    Lymphocytes % 52.9 13.0 - 44.0 %    Monocytes % 6.9 2.0 - 10.0 %    Eosinophils % 2.8 0.0 - 6.0 %    Basophils % 1.3 0.0 - 2.0 %    Neutrophils Absolute 1.68 1.20 - 7.70 x10E9/L    Lymphocytes Absolute 2.47 1.20 - 4.80 x10E9/L    Monocytes Absolute 0.32 0.10 - 1.00 x10E9/L    Eosinophils Absolute 0.13 0.00 - 0.70 x10E9/L    Basophils Absolute 0.06 0.00 - 0.10 x10E9/L   Measles (Rubeola) Antibody, IgG    Collection Time: 08/22/23  10:27 AM   Result Value Ref Range    Rubeola IgG POSITIVE    Rubella antibody, IgG    Collection Time: 08/22/23 10:27 AM   Result Value Ref Range    Rubella IgG POSITIVE    Mumps Antibody, IgG    Collection Time: 08/22/23 10:27 AM   Result Value Ref Range    Mumps IgG POSITIVE    Varicella zoster antibody, IgG    Collection Time: 08/22/23 10:27 AM   Result Value Ref Range    Varicella IgG POSITIVE NEGATIVE   Hepatitis B Surface Antibody    Collection Time: 08/22/23 10:27 AM   Result Value Ref Range    Hepatitis B Surface Ab <3.1 <10 mIU/mL         Assessment/Plan   Problem List Items Addressed This Visit       Anxiety and depression - Primary     Sertraline increased to 100 mg last visit. Had been having issues with concentration.           Other Visit Diagnoses       Need for vaccination                  Assessment, plans, tests, and follow up discussed with patient and patient verbalized understanding. Griselda was given an opportunity to ask questions and  any concerns were addressed including but not limited to ***.

## 2023-08-28 ENCOUNTER — APPOINTMENT (OUTPATIENT)
Dept: PRIMARY CARE | Facility: CLINIC | Age: 33
End: 2023-08-28
Payer: COMMERCIAL

## 2023-09-11 ENCOUNTER — APPOINTMENT (OUTPATIENT)
Dept: PRIMARY CARE | Facility: CLINIC | Age: 33
End: 2023-09-11
Payer: COMMERCIAL

## 2023-09-28 ENCOUNTER — PATIENT MESSAGE (OUTPATIENT)
Dept: PRIMARY CARE | Facility: CLINIC | Age: 33
End: 2023-09-28
Payer: COMMERCIAL

## 2023-10-16 ENCOUNTER — APPOINTMENT (OUTPATIENT)
Dept: PRIMARY CARE | Facility: CLINIC | Age: 33
End: 2023-10-16
Payer: COMMERCIAL

## 2024-01-18 ENCOUNTER — PATIENT MESSAGE (OUTPATIENT)
Dept: PRIMARY CARE | Facility: CLINIC | Age: 34
End: 2024-01-18
Payer: COMMERCIAL

## 2024-05-23 ENCOUNTER — APPOINTMENT (OUTPATIENT)
Dept: PRIMARY CARE | Facility: CLINIC | Age: 34
End: 2024-05-23
Payer: COMMERCIAL

## 2024-06-13 ENCOUNTER — APPOINTMENT (OUTPATIENT)
Dept: PRIMARY CARE | Facility: CLINIC | Age: 34
End: 2024-06-13
Payer: COMMERCIAL

## 2024-08-02 ENCOUNTER — APPOINTMENT (OUTPATIENT)
Dept: OBSTETRICS AND GYNECOLOGY | Facility: CLINIC | Age: 34
End: 2024-08-02
Payer: COMMERCIAL

## 2024-08-02 VITALS
HEIGHT: 62 IN | BODY MASS INDEX: 23 KG/M2 | SYSTOLIC BLOOD PRESSURE: 98 MMHG | WEIGHT: 125 LBS | DIASTOLIC BLOOD PRESSURE: 60 MMHG

## 2024-08-02 DIAGNOSIS — Z11.3 SCREEN FOR STD (SEXUALLY TRANSMITTED DISEASE): ICD-10-CM

## 2024-08-02 DIAGNOSIS — Z01.411 ENCOUNTER FOR GYNECOLOGICAL EXAMINATION WITH ABNORMAL FINDING: Primary | ICD-10-CM

## 2024-08-02 DIAGNOSIS — N94.6 DYSMENORRHEA: ICD-10-CM

## 2024-08-02 DIAGNOSIS — N92.0 MENORRHAGIA WITH REGULAR CYCLE: ICD-10-CM

## 2024-08-02 PROBLEM — N39.41 URGENCY INCONTINENCE: Status: RESOLVED | Noted: 2023-04-03 | Resolved: 2024-08-02

## 2024-08-02 PROBLEM — R87.612 LOW GRADE SQUAMOUS INTRAEPITHELIAL LESION (LGSIL) ON CERVICAL PAP SMEAR: Status: RESOLVED | Noted: 2023-03-29 | Resolved: 2024-08-02

## 2024-08-02 PROBLEM — B97.7 HIGH RISK HUMAN PAPILLOMA VIRUS (HPV) INFECTION OF CERVIX: Status: RESOLVED | Noted: 2023-03-29 | Resolved: 2024-08-02

## 2024-08-02 PROBLEM — N72 HIGH RISK HUMAN PAPILLOMA VIRUS (HPV) INFECTION OF CERVIX: Status: RESOLVED | Noted: 2023-03-29 | Resolved: 2024-08-02

## 2024-08-02 PROCEDURE — 99395 PREV VISIT EST AGE 18-39: CPT | Performed by: OBSTETRICS & GYNECOLOGY

## 2024-08-02 PROCEDURE — 87661 TRICHOMONAS VAGINALIS AMPLIF: CPT

## 2024-08-02 PROCEDURE — 3008F BODY MASS INDEX DOCD: CPT | Performed by: OBSTETRICS & GYNECOLOGY

## 2024-08-02 PROCEDURE — 87491 CHLMYD TRACH DNA AMP PROBE: CPT

## 2024-08-02 PROCEDURE — 1036F TOBACCO NON-USER: CPT | Performed by: OBSTETRICS & GYNECOLOGY

## 2024-08-02 PROCEDURE — 87591 N.GONORRHOEAE DNA AMP PROB: CPT

## 2024-08-02 NOTE — PROGRESS NOTES
Griselda Little is a 33 y.o. female who is here for a routine exam. PCP = Madalyn Thibodeaux MD  Desires STD check  APE Concerns: Periods are longer and heavier and more painful    Other Concerns: none  Preventative:  Seat Belt Use: always    GynHx:  Periods are regular every 28-30 days, lasting 7 days.  Dysmenorrhea: moderate, occurring first 1-2 days of flow.   Cyclic symptoms include none.    Social History     Substance and Sexual Activity   Sexual Activity Not Currently    Birth control/protection: None     Current contraception: None  STIs: none  Last PAP: 2023, negative      SoHx:  Substance:   Tobacco Use: Low Risk  (8/2/2024)    Patient History     Smoking Tobacco Use: Never     Smokeless Tobacco Use: Never     Passive Exposure: Not on file      Social History     Substance and Sexual Activity   Drug Use Never      Social History     Substance and Sexual Activity   Alcohol Use Never       Past Medical History:   Diagnosis Date    Abnormal uterine and vaginal bleeding, unspecified 11/07/2014    Abnormal bleeding in menstrual cycle    Allergic reaction to insect sting 03/29/2023    Anemia complicating pregnancy, second trimester ([facility]) 12/23/2021    Anemia of mother in pregnancy, antepartum, second trimester    Anemia complicating pregnancy, third trimester ([facility]) 03/10/2022    Anemia during pregnancy in third trimester    ASCUS with positive high risk HPV cervical 03/29/2023    Atypical squamous cells of undetermined significance on cytologic smear of cervix (ASC-US)     ASCUS on Pap smear    Bacterial vaginosis 03/29/2023    Bilateral headaches 03/29/2023    Bloating 03/29/2023    Low grade squamous intraepithelial lesion (LGSIL) on cervical Pap smear 03/29/2023    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 No visible thyromegaly.    Breast/Axilla: Normal to palpation bilaterally without masses, skin changes, or nipple discharge.    Abdomen: Soft, non-tender, without masses or organomegaly   Vulva: Normal architecture without erythema, masses, or lesions.    Vagina: Normal mucosa without lesions, masses, or atrophy. No abnormal vaginal discharge.    Cervix: Normal without masses, lesions, or signs of cervicitis.    Uterus: Normal mobile, non-enlarged uterus    Adnexa: Normal without masses or lesions   Pelvic Floor No POP noted. No high tone pelvic floor    Psych Normal affect. Normal mood.      Problem List Items Addressed This Visit       Dysmenorrhea    Encounter for gynecological examination with abnormal finding - Primary    Menorrhagia with regular cycle      Assessment/Plan    Thank you for coming to your annual exam. Your findings during the exam were normal. Specific topics addressed during this exam included: healthy lifestyle, well woman screening guidelines,  Healthy diet with high fiber, Weight bearing exercise 3 to 5 times a week, Multivitamins and calcium     Actions performed during this visit include:  - Clinical breast exam  - Clinical pelvic exam  - GC/CT /testing ordered  - Pelvic ultrasound ordered       Please return for your next visit in 1 year.  "

## 2024-08-03 LAB
C TRACH RRNA SPEC QL NAA+PROBE: NEGATIVE
N GONORRHOEA DNA SPEC QL PROBE+SIG AMP: NEGATIVE
T VAGINALIS RRNA SPEC QL NAA+PROBE: NEGATIVE

## 2024-08-16 ENCOUNTER — HOSPITAL ENCOUNTER (OUTPATIENT)
Dept: RADIOLOGY | Facility: CLINIC | Age: 34
Discharge: HOME | End: 2024-08-16
Payer: COMMERCIAL

## 2024-08-16 DIAGNOSIS — N94.6 DYSMENORRHEA: ICD-10-CM

## 2024-08-16 DIAGNOSIS — N92.0 MENORRHAGIA WITH REGULAR CYCLE: ICD-10-CM

## 2024-08-16 PROCEDURE — 76856 US EXAM PELVIC COMPLETE: CPT

## 2024-08-26 ENCOUNTER — APPOINTMENT (OUTPATIENT)
Dept: PRIMARY CARE | Facility: CLINIC | Age: 34
End: 2024-08-26
Payer: COMMERCIAL

## 2024-08-26 ENCOUNTER — LAB (OUTPATIENT)
Dept: LAB | Facility: LAB | Age: 34
End: 2024-08-26
Payer: COMMERCIAL

## 2024-08-26 VITALS
DIASTOLIC BLOOD PRESSURE: 78 MMHG | SYSTOLIC BLOOD PRESSURE: 117 MMHG | OXYGEN SATURATION: 96 % | WEIGHT: 126.2 LBS | RESPIRATION RATE: 16 BRPM | BODY MASS INDEX: 23.22 KG/M2 | HEIGHT: 62 IN | HEART RATE: 86 BPM | TEMPERATURE: 97.7 F

## 2024-08-26 DIAGNOSIS — Z00.00 WELL ADULT EXAM: ICD-10-CM

## 2024-08-26 DIAGNOSIS — Z13.1 SCREENING FOR DIABETES MELLITUS: ICD-10-CM

## 2024-08-26 DIAGNOSIS — F41.1 GAD (GENERALIZED ANXIETY DISORDER): Primary | ICD-10-CM

## 2024-08-26 DIAGNOSIS — Z23 NEED FOR VACCINATION: ICD-10-CM

## 2024-08-26 PROBLEM — R87.619 ABNORMAL CERVICAL PAPANICOLAOU SMEAR: Status: ACTIVE | Noted: 2023-03-29

## 2024-08-26 PROBLEM — R63.4 WEIGHT LOSS: Status: RESOLVED | Noted: 2023-05-03 | Resolved: 2024-08-26

## 2024-08-26 PROBLEM — R87.612 LOW GRADE SQUAMOUS INTRAEPITHELIAL LESION (LGSIL) ON PAPANICOLAOU SMEAR OF CERVIX: Status: RESOLVED | Noted: 2024-08-26 | Resolved: 2024-08-26

## 2024-08-26 LAB
ANION GAP SERPL CALC-SCNC: 10 MMOL/L (ref 10–20)
BUN SERPL-MCNC: 9 MG/DL (ref 6–23)
CALCIUM SERPL-MCNC: 9.5 MG/DL (ref 8.6–10.3)
CHLORIDE SERPL-SCNC: 104 MMOL/L (ref 98–107)
CO2 SERPL-SCNC: 29 MMOL/L (ref 21–32)
CREAT SERPL-MCNC: 0.65 MG/DL (ref 0.5–1.05)
EGFRCR SERPLBLD CKD-EPI 2021: >90 ML/MIN/1.73M*2
EST. AVERAGE GLUCOSE BLD GHB EST-MCNC: 105 MG/DL
GLUCOSE SERPL-MCNC: 70 MG/DL (ref 74–99)
HBA1C MFR BLD: 5.3 %
POTASSIUM SERPL-SCNC: 4.2 MMOL/L (ref 3.5–5.3)
SODIUM SERPL-SCNC: 139 MMOL/L (ref 136–145)

## 2024-08-26 PROCEDURE — 80048 BASIC METABOLIC PNL TOTAL CA: CPT

## 2024-08-26 PROCEDURE — 99395 PREV VISIT EST AGE 18-39: CPT | Performed by: FAMILY MEDICINE

## 2024-08-26 PROCEDURE — 3008F BODY MASS INDEX DOCD: CPT | Performed by: FAMILY MEDICINE

## 2024-08-26 PROCEDURE — 1036F TOBACCO NON-USER: CPT | Performed by: FAMILY MEDICINE

## 2024-08-26 PROCEDURE — 36415 COLL VENOUS BLD VENIPUNCTURE: CPT

## 2024-08-26 PROCEDURE — 83036 HEMOGLOBIN GLYCOSYLATED A1C: CPT

## 2024-08-26 RX ORDER — BUSPIRONE HYDROCHLORIDE 15 MG/1
15 TABLET ORAL 3 TIMES DAILY PRN
Qty: 90 TABLET | Refills: 1 | Status: SHIPPED | OUTPATIENT
Start: 2024-08-26 | End: 2024-10-25

## 2024-08-26 RX ORDER — ESCITALOPRAM OXALATE 10 MG/1
10 TABLET ORAL DAILY
Qty: 30 TABLET | Refills: 1 | Status: SHIPPED | OUTPATIENT
Start: 2024-08-26 | End: 2024-10-25

## 2024-08-26 ASSESSMENT — ENCOUNTER SYMPTOMS
CONSTIPATION: 1
WHEEZING: 0
ACTIVITY CHANGE: 0
DIARRHEA: 0
JOINT SWELLING: 0
COUGH: 0
POLYDIPSIA: 0
UNEXPECTED WEIGHT CHANGE: 0
HEADACHES: 0
AGITATION: 0
TROUBLE SWALLOWING: 0
DIFFICULTY URINATING: 0
CONFUSION: 0
SHORTNESS OF BREATH: 0
DEPRESSION: 0
VOMITING: 0
SLEEP DISTURBANCE: 1
LOSS OF SENSATION IN FEET: 0
ABDOMINAL PAIN: 0
DECREASED CONCENTRATION: 1
DYSPHORIC MOOD: 0
OCCASIONAL FEELINGS OF UNSTEADINESS: 0
NERVOUS/ANXIOUS: 1
FATIGUE: 0
ARTHRALGIAS: 0
APPETITE CHANGE: 0
PALPITATIONS: 0
HALLUCINATIONS: 0
NAUSEA: 0
BLOOD IN STOOL: 0
POLYPHAGIA: 0
SEIZURES: 0

## 2024-08-26 ASSESSMENT — PATIENT HEALTH QUESTIONNAIRE - PHQ9
1. LITTLE INTEREST OR PLEASURE IN DOING THINGS: NOT AT ALL
2. FEELING DOWN, DEPRESSED OR HOPELESS: NOT AT ALL
SUM OF ALL RESPONSES TO PHQ9 QUESTIONS 1 AND 2: 0

## 2024-08-26 NOTE — ASSESSMENT & PLAN NOTE
Griselda was counseled on diagnosis and treatment options, including counseling and medications. After shared decision-making, patient agreed to start antidepressant medication. She understands latency of effect on initiation, need to titrate dose, need to take medication on a scheduled basis at same time every day, and need to call before stopping or altering medication dosing. Griselda also understands risk of transient increase in anxiety on initiation of medication, risk of chaya in undiagnosed bipolar disorder, risk of withdrawal if stops suddenly, need for extended duration of medication of at least 6-12 months once therapeutic dose of correct agent achieved, and possible need to try alternate agents if initial agent ineffective on titration. Discussed risk, benefits and side effects of medication, including increased risk of suicidal ideation. Patient is not currently a risk to self or others, is able to contract for safety, and did so at this visit. Patient agreed to call if any side effects and discuss this before stopping medication so can assess and properly address. Agrees to frequent follow up during titration phase. Given information re 988 for crisis and suicide prevention. Griselda agreed to seek immediate help if She feels there is imminent risk of self-harm.     Patient was started on Escitalopram 10 mg daily. In addition, was also offered and accepted BUspirone for prn use for anxiety.

## 2024-08-26 NOTE — PATIENT INSTRUCTIONS
Start Escitalopram 10 mg daily for anxiety, this needs to be taken every day.     Buspirone 15 mg tablet is prescribed. Can divide it and take anywhere from 5 to 15 mg as needed for anxiety.     Call [phone] if need someone to talk to, can talk to a counselor.

## 2024-08-26 NOTE — PROGRESS NOTES
Subjective   Patient ID: Griselda Little is a 33 y.o. female who presents for Annual Exam (Patient like to discuss Sertraline prescription ).    Anxiety follow up  Has been stable on Sertraline, stopped it 3 months ago. Worked well but caused mouth to be dry. Mouth got better when stopped it. No depression symptoms. Anxious, overwhelmed, difficulty focusing. Indecisive. Has never been on any other medication. Was able to stay asleep better on it. No longer breastfeeding.     Also here for physical.     Here for annual wellness exam. Last wellness over a year. .     New concerns:no  Feels: fairly well    Changes  to health/medications since last visit as above  Other providers seen since last visit GYN  Periods: Usually regular, had US doen recently with cyst on left ovary, scheduling follow up  Contraception: no    Healthy lifestyle habits: Regular exercise: struggling to organize self to do it                                         Dietary habits: good                                        Social connections/relationships not in relationship, lives with daughter                                        Wears seatbelts Yes                                         Sunscreen - advised on use                                        Sexual Risk Factors No        Health screenings:  Pap smear: recent HPV neg repeat due per gyn                                  Mammogram not applicable                                  Self-breast Exam No                                   Colon screening not applicable                                  Dexa not applicable                                  Hep C screening Yes                                   HIV screening yes                                  STI screeningyes                          Health risks: Domestic Violence no                      Work-life balance Full time student                      Smoke detectors Yes                       Carbon monoxide detectors yes                       Gun safety not applicable                      Second-hand Smoke No                       Occupational Risks no    Immunizations:  Influenza:  No                             Tdap: 2021                            HPV: not applicable                           Pneumonia: no                           Shingrix: not applicable                           COVID: No                    Current Outpatient Medications:     clindamycin (Cleocin T) 1 % lotion, Apply 1 Application topically 2 times a day., Disp: , Rfl:     ketoconazole (NIZOral) 2 % shampoo, apply to scalp every week LEAVE ON SCALP FOR 10-15 MINUTES THEN R...  (REFER TO PRESCRIPTION NOTES)., Disp: , Rfl:     loratadine (Claritin) 10 mg tablet, Take 1 tablet (10 mg) by mouth once daily., Disp: , Rfl:     sertraline (Zoloft) 100 mg tablet, Take 1.5 tablets (150 mg) by mouth once daily., Disp: 135 tablet, Rfl: 3    busPIRone (Buspar) 15 mg tablet, Take 1 tablet (15 mg) by mouth 3 times a day as needed (anxiety)., Disp: 90 tablet, Rfl: 1    escitalopram (Lexapro) 10 mg tablet, Take 1 tablet (10 mg) by mouth once daily., Disp: 30 tablet, Rfl: 1    triamcinolone (Kenalog) 0.1 % ointment, Apply 1 Application topically 2 times a week. To scalp, Disp: , Rfl:     Patient Active Problem List   Diagnosis    Abnormal cervical Papanicolaou smear    BRODY (generalized anxiety disorder)    Dysmenorrhea    Menorrhagia with regular cycle    Encounter for gynecological examination with abnormal finding         Review of Systems   Constitutional:  Negative for activity change, appetite change, fatigue and unexpected weight change.   HENT:  Negative for dental problem, hearing loss and trouble swallowing.    Eyes:  Negative for visual disturbance.   Respiratory:  Negative for cough, shortness of breath and wheezing.    Cardiovascular:  Negative for chest pain, palpitations and leg swelling.   Gastrointestinal:  Positive for constipation. Negative for abdominal pain, blood in stool,  "diarrhea, nausea and vomiting.   Endocrine: Negative for cold intolerance, heat intolerance, polydipsia, polyphagia and polyuria.   Genitourinary:  Negative for difficulty urinating and menstrual problem.   Musculoskeletal:  Negative for arthralgias and joint swelling.   Neurological:  Negative for seizures, syncope and headaches.   Psychiatric/Behavioral:  Positive for decreased concentration and sleep disturbance. Negative for agitation, behavioral problems, confusion, dysphoric mood, hallucinations, self-injury and suicidal ideas. The patient is nervous/anxious.        Objective   /78 (BP Location: Left arm, Patient Position: Sitting, BP Cuff Size: Small adult)   Pulse 86   Temp 36.5 °C (97.7 °F) (Temporal)   Resp 16   Ht 1.575 m (5' 2\")   Wt 57.2 kg (126 lb 3.2 oz)   LMP 07/04/2024 (Exact Date)   SpO2 96%   BMI 23.08 kg/m²     Physical Exam  Vitals reviewed.   Constitutional:       Appearance: Normal appearance.   HENT:      Head: Normocephalic and atraumatic.      Right Ear: Tympanic membrane normal.      Left Ear: Tympanic membrane normal.      Nose: Nose normal.      Mouth/Throat:      Pharynx: Oropharynx is clear.   Eyes:      Extraocular Movements: Extraocular movements intact.      Conjunctiva/sclera: Conjunctivae normal.      Pupils: Pupils are equal, round, and reactive to light.   Neck:      Vascular: No carotid bruit.   Cardiovascular:      Rate and Rhythm: Normal rate and regular rhythm.      Pulses: Normal pulses.      Heart sounds: No murmur heard.  Pulmonary:      Effort: Pulmonary effort is normal.      Breath sounds: Normal breath sounds.   Abdominal:      Palpations: There is no hepatomegaly, splenomegaly or mass.      Tenderness: There is no abdominal tenderness.   Musculoskeletal:         General: Normal range of motion.      Cervical back: Normal range of motion.      Left lower leg: No edema.   Skin:     General: Skin is warm and dry.      Findings: No rash.   Neurological:     "  General: No focal deficit present.      Mental Status: She is alert. Mental status is at baseline.      Gait: Gait is intact.   Psychiatric:         Mood and Affect: Mood normal.         Behavior: Behavior normal.         Thought Content: Thought content normal.      Comments: Anxious, no flight of ideas or pressured speech.          Assessment/Plan   Problem List Items Addressed This Visit       BRODY (generalized anxiety disorder) - Primary     Griselda was counseled on diagnosis and treatment options, including counseling and medications. After shared decision-making, patient agreed to start antidepressant medication. She understands latency of effect on initiation, need to titrate dose, need to take medication on a scheduled basis at same time every day, and need to call before stopping or altering medication dosing. Griselda also understands risk of transient increase in anxiety on initiation of medication, risk of chaya in undiagnosed bipolar disorder, risk of withdrawal if stops suddenly, need for extended duration of medication of at least 6-12 months once therapeutic dose of correct agent achieved, and possible need to try alternate agents if initial agent ineffective on titration. Discussed risk, benefits and side effects of medication, including increased risk of suicidal ideation. Patient is not currently a risk to self or others, is able to contract for safety, and did so at this visit. Patient agreed to call if any side effects and discuss this before stopping medication so can assess and properly address. Agrees to frequent follow up during titration phase. Given information re 988 for crisis and suicide prevention. Griselda agreed to seek immediate help if She feels there is imminent risk of self-harm.     Patient was started on Escitalopram 10 mg daily. In addition, was also offered and accepted BUspirone for prn use for anxiety.            Relevant Medications    escitalopram (Lexapro) 10 mg tablet     busPIRone (Buspar) 15 mg tablet    Other Relevant Orders    Follow Up In Primary Care - Established     Other Visit Diagnoses       Well adult exam        UP o date on prventive measures. Need to increase exercisse    Need for vaccination        Flu and covid boosters in fall    Screening for diabetes mellitus        Lab ordered    Relevant Orders    Hemoglobin A1C    Basic Metabolic Panel              Assessment, plans, tests, and follow up discussed with patient and patient verbalized understanding. Griselda was given an opportunity to ask questions and  any concerns were addressed including but not limited to medications, orders, follow up and indications.

## 2024-09-26 ENCOUNTER — APPOINTMENT (OUTPATIENT)
Dept: PRIMARY CARE | Facility: CLINIC | Age: 34
End: 2024-09-26
Payer: COMMERCIAL

## 2024-10-03 ENCOUNTER — HOSPITAL ENCOUNTER (OUTPATIENT)
Dept: RADIOLOGY | Facility: CLINIC | Age: 34
Discharge: HOME | End: 2024-10-03
Payer: COMMERCIAL

## 2024-10-03 DIAGNOSIS — N83.292 COMPLEX CYST OF LEFT OVARY: ICD-10-CM

## 2024-10-03 PROCEDURE — 76856 US EXAM PELVIC COMPLETE: CPT

## 2024-10-08 ENCOUNTER — TELEPHONE (OUTPATIENT)
Dept: OBSTETRICS AND GYNECOLOGY | Facility: CLINIC | Age: 34
End: 2024-10-08
Payer: COMMERCIAL

## 2024-10-08 NOTE — TELEPHONE ENCOUNTER
----- Message from Marcy Pal sent at 10/8/2024 11:35 AM EDT -----  Regarding: FW:  Pelvic ultrasound reviewed, mildly enlarged uterus with thickened endometrial lining and bilateral small simple (serous) ovarian cysts noted .   Please schedule appointment for EMB, with premedication instructions( 2 OTC Aleve, after food 1/2 to one hour prior to appointment) . Patient with H/o menorrhagia.  ----- Message -----  From: Interface, Radiology Results In  Sent: 10/4/2024   6:31 PM EDT  To: Marcy Pal MD

## 2024-10-09 ENCOUNTER — TELEPHONE (OUTPATIENT)
Dept: OBSTETRICS AND GYNECOLOGY | Facility: CLINIC | Age: 34
End: 2024-10-09
Payer: COMMERCIAL

## 2024-10-31 PROBLEM — N83.202 CYSTS OF BOTH OVARIES: Status: ACTIVE | Noted: 2024-10-31

## 2024-10-31 PROBLEM — R93.89 ENDOMETRIAL THICKENING ON ULTRASOUND: Status: ACTIVE | Noted: 2024-10-31

## 2024-10-31 PROBLEM — N83.201 CYSTS OF BOTH OVARIES: Status: ACTIVE | Noted: 2024-10-31

## 2024-10-31 PROBLEM — R87.619 ABNORMAL CERVICAL PAPANICOLAOU SMEAR: Status: RESOLVED | Noted: 2023-03-29 | Resolved: 2024-10-31

## 2024-11-01 ENCOUNTER — APPOINTMENT (OUTPATIENT)
Dept: OBSTETRICS AND GYNECOLOGY | Facility: CLINIC | Age: 34
End: 2024-11-01
Payer: COMMERCIAL

## 2024-11-01 VITALS — BODY MASS INDEX: 22.68 KG/M2 | DIASTOLIC BLOOD PRESSURE: 58 MMHG | WEIGHT: 124 LBS | SYSTOLIC BLOOD PRESSURE: 88 MMHG

## 2024-11-01 DIAGNOSIS — N94.6 DYSMENORRHEA: Primary | ICD-10-CM

## 2024-11-01 DIAGNOSIS — N83.202 CYSTS OF BOTH OVARIES: ICD-10-CM

## 2024-11-01 DIAGNOSIS — B37.31 VAGINAL YEAST INFECTION: ICD-10-CM

## 2024-11-01 DIAGNOSIS — R93.89 ENDOMETRIAL THICKENING ON ULTRASOUND: ICD-10-CM

## 2024-11-01 DIAGNOSIS — Z32.02 PREGNANCY TEST NEGATIVE: ICD-10-CM

## 2024-11-01 DIAGNOSIS — N83.201 CYSTS OF BOTH OVARIES: ICD-10-CM

## 2024-11-01 DIAGNOSIS — N92.0 MENORRHAGIA WITH REGULAR CYCLE: ICD-10-CM

## 2024-11-01 DIAGNOSIS — Z11.3 SCREEN FOR STD (SEXUALLY TRANSMITTED DISEASE): ICD-10-CM

## 2024-11-01 DIAGNOSIS — N89.8 VAGINAL DISCHARGE: ICD-10-CM

## 2024-11-01 LAB — PREGNANCY TEST URINE, POC: NEGATIVE

## 2024-11-01 PROCEDURE — 87661 TRICHOMONAS VAGINALIS AMPLIF: CPT

## 2024-11-01 PROCEDURE — 87591 N.GONORRHOEAE DNA AMP PROB: CPT

## 2024-11-01 PROCEDURE — 87205 SMEAR GRAM STAIN: CPT

## 2024-11-01 PROCEDURE — 87491 CHLMYD TRACH DNA AMP PROBE: CPT

## 2024-11-01 RX ORDER — FLUCONAZOLE 150 MG/1
150 TABLET ORAL ONCE
Qty: 1 TABLET | Refills: 0 | Status: SHIPPED | OUTPATIENT
Start: 2024-11-01 | End: 2024-11-01

## 2024-11-01 RX ORDER — NAPROXEN 500 MG/1
500 TABLET ORAL
Qty: 60 TABLET | Refills: 11 | Status: SHIPPED | OUTPATIENT
Start: 2024-11-01 | End: 2025-11-01

## 2024-11-03 LAB
CLUE CELLS VAG LPF-#/AREA: ABNORMAL /[LPF]
NUGENT SCORE: 2
YEAST VAG WET PREP-#/AREA: PRESENT

## 2024-11-04 ENCOUNTER — APPOINTMENT (OUTPATIENT)
Dept: OBSTETRICS AND GYNECOLOGY | Facility: CLINIC | Age: 34
End: 2024-11-04
Payer: COMMERCIAL

## 2024-11-04 ENCOUNTER — APPOINTMENT (OUTPATIENT)
Dept: LAB | Facility: LAB | Age: 34
End: 2024-11-04
Payer: COMMERCIAL

## 2024-11-04 VITALS — SYSTOLIC BLOOD PRESSURE: 100 MMHG | DIASTOLIC BLOOD PRESSURE: 60 MMHG | BODY MASS INDEX: 23.23 KG/M2 | WEIGHT: 127 LBS

## 2024-11-04 DIAGNOSIS — B37.31 CANDIDAL VULVOVAGINITIS: Primary | ICD-10-CM

## 2024-11-04 DIAGNOSIS — N91.5 OLIGOMENORRHEA, UNSPECIFIED TYPE: ICD-10-CM

## 2024-11-04 DIAGNOSIS — R53.83 OTHER FATIGUE: ICD-10-CM

## 2024-11-04 DIAGNOSIS — R93.89 ENDOMETRIAL THICKENING ON ULTRASOUND: ICD-10-CM

## 2024-11-04 PROBLEM — N91.4 SECONDARY OLIGOMENORRHEA: Status: ACTIVE | Noted: 2024-11-04

## 2024-11-04 PROCEDURE — 58100 BIOPSY OF UTERUS LINING: CPT | Performed by: OBSTETRICS & GYNECOLOGY

## 2024-11-04 RX ORDER — FLUCONAZOLE 150 MG/1
150 TABLET ORAL ONCE
Qty: 1 TABLET | Refills: 0 | Status: SHIPPED | OUTPATIENT
Start: 2024-11-04 | End: 2024-11-04

## 2024-11-06 ENCOUNTER — TELEPHONE (OUTPATIENT)
Dept: OBSTETRICS AND GYNECOLOGY | Facility: CLINIC | Age: 34
End: 2024-11-06
Payer: COMMERCIAL

## 2024-11-06 NOTE — TELEPHONE ENCOUNTER
Returned patient call. Patient has been having light bleeding, not heavy since intercourse. Advised patient that after an EMB she can bleed for several days. Patient has some cramps, no severe abdominal pain at this time. Patient will monitor. Patient aware if bleeding becomes heavy where she soaks more than 1 pad an hour,  or develops severe abdominal pain, then she will call in for evaluation. AT this time, patient also encouraged to refrain from intercourse for about 5 days post EMB.

## 2024-11-11 LAB
LABORATORY COMMENT REPORT: NORMAL
PATH REPORT.FINAL DX SPEC: NORMAL
PATH REPORT.GROSS SPEC: NORMAL
PATH REPORT.RELEVANT HX SPEC: NORMAL
PATH REPORT.TOTAL CANCER: NORMAL

## 2024-11-12 ENCOUNTER — TELEPHONE (OUTPATIENT)
Dept: OBSTETRICS AND GYNECOLOGY | Facility: CLINIC | Age: 34
End: 2024-11-12
Payer: COMMERCIAL

## 2024-11-12 NOTE — TELEPHONE ENCOUNTER
----- Message from Marcy Pal sent at 11/11/2024  6:45 PM EST -----  Ultrasound reviewed, normal  ----- Message -----  From: Lab, Background User  Sent: 11/11/2024  10:39 AM EST  To: Marcy Pal MD

## 2025-06-11 ENCOUNTER — APPOINTMENT (OUTPATIENT)
Dept: OBSTETRICS AND GYNECOLOGY | Facility: CLINIC | Age: 35
End: 2025-06-11
Payer: COMMERCIAL

## 2025-06-11 VITALS
DIASTOLIC BLOOD PRESSURE: 52 MMHG | BODY MASS INDEX: 21.35 KG/M2 | WEIGHT: 116 LBS | SYSTOLIC BLOOD PRESSURE: 100 MMHG | HEIGHT: 62 IN

## 2025-06-11 DIAGNOSIS — R10.2 PELVIC PAIN: Primary | ICD-10-CM

## 2025-06-11 PROCEDURE — 1036F TOBACCO NON-USER: CPT | Performed by: NURSE PRACTITIONER

## 2025-06-11 PROCEDURE — 99214 OFFICE O/P EST MOD 30 MIN: CPT | Performed by: NURSE PRACTITIONER

## 2025-06-11 PROCEDURE — 3008F BODY MASS INDEX DOCD: CPT | Performed by: NURSE PRACTITIONER

## 2025-06-11 RX ORDER — DESOGESTREL AND ETHINYL ESTRADIOL 0.15-0.03
1 KIT ORAL DAILY
Qty: 84 TABLET | Refills: 3 | Status: SHIPPED | OUTPATIENT
Start: 2025-06-11 | End: 2026-06-11

## 2025-06-11 ASSESSMENT — ENCOUNTER SYMPTOMS
RESPIRATORY NEGATIVE: 1
CONSTITUTIONAL NEGATIVE: 1
PSYCHIATRIC NEGATIVE: 1
GASTROINTESTINAL NEGATIVE: 1

## 2025-06-11 NOTE — PROGRESS NOTES
Subjective   Patient ID: Griselda Little is a 34 y.o. female who presents for Pelvic Pain (Patient complains of bad cramps. States she is having bad period cramps with her cycle that proceed even off of her period. /Normal PAP / HPV with Dr. Pal 7/28/2023).  34 year old here for complaints of having ongoing pelvic pain.  She had an ultrasound in October 2024 which showed bilateral ovarian cysts.  She also had a thickened endometrium.  She notes that she had a biopsy with Dr. Pal with normal results.  She first noticed in January 2025 that her pain was increasing to be daily.  The pain is a cramping sensation that happens every day and will come and go throughout the day.  The pain is mostly lower near her ovaries but will occur on both sides and in her lower back as well.  She denies any urinary changes including dysuria, frequency and urgency.  The pain will improve x 2-3 days when she starts her period.  She denies any changes to her period including being heavier or large clots. She denies any vaginal discharge and breakthrough bleeding.  She notes a chronic issue with constipation.      Pelvic Pain  The patient's primary symptoms include pelvic pain.       Review of Systems   Constitutional: Negative.    Respiratory: Negative.     Gastrointestinal: Negative.    Genitourinary:  Positive for pelvic pain and vaginal bleeding.   Skin: Negative.    Psychiatric/Behavioral: Negative.         Objective   Physical Exam  Constitutional:       Appearance: Normal appearance.   Pulmonary:      Effort: Pulmonary effort is normal.   Abdominal:      Hernia: There is no hernia in the right inguinal area.   Genitourinary:     General: Normal vulva.      Exam position: Supine.      Labia:         Right: No rash, tenderness, lesion or injury.         Left: No rash, tenderness, lesion or injury.       Urethra: No prolapse, urethral pain, urethral swelling or urethral lesion.      Vagina: Normal.      Cervix: Normal.       Uterus: Normal.       Adnexa:         Right: Tenderness present.         Left: Tenderness present.       Rectum: Normal.   Lymphadenopathy:      Lower Body: No right inguinal adenopathy.   Skin:     General: Skin is warm and dry.   Neurological:      Mental Status: She is alert.   Psychiatric:         Mood and Affect: Mood normal.         Behavior: Behavior normal.         Thought Content: Thought content normal.         Judgment: Judgment normal.         Assessment/Plan   Problem List Items Addressed This Visit    None  Visit Diagnoses         Codes      Pelvic pain    -  Primary R10.2    Relevant Medications    desogestrel-ethinyl estradiol (Apri) 0.15-0.03 mg tablet    Other Relevant Orders    US PELVIS TRANSABDOMINAL WITH TRANSVAGINAL        Oral contraceptives were discussed.  The risks and benefits were presented to the patient including the risk for VTE's and an increased risk with smoking. Patient stated understanding and desires use of OCP.  Additional use of condoms encouraged to patient to prevent STI's.   Apri ordered with refills x 1 year  Pelvic ultrasound ordered, reviewed if no change can continue to monitor, use ocp.  If no improvement in pain with ocp can return with Dr. Pal for evaluation/consultation           JOSUÉ Madrigal-CNP 06/11/25 1:45 PM

## 2025-06-16 ENCOUNTER — APPOINTMENT (OUTPATIENT)
Dept: RADIOLOGY | Facility: CLINIC | Age: 35
End: 2025-06-16
Payer: COMMERCIAL

## 2025-06-27 ENCOUNTER — APPOINTMENT (OUTPATIENT)
Dept: PRIMARY CARE | Facility: CLINIC | Age: 35
End: 2025-06-27
Payer: COMMERCIAL

## 2025-06-27 VITALS
HEART RATE: 79 BPM | OXYGEN SATURATION: 97 % | WEIGHT: 118.4 LBS | SYSTOLIC BLOOD PRESSURE: 97 MMHG | DIASTOLIC BLOOD PRESSURE: 62 MMHG | BODY MASS INDEX: 21.66 KG/M2 | TEMPERATURE: 97.2 F

## 2025-06-27 DIAGNOSIS — D50.8 IRON DEFICIENCY ANEMIA SECONDARY TO INADEQUATE DIETARY IRON INTAKE: ICD-10-CM

## 2025-06-27 DIAGNOSIS — Z13.220 SCREENING, LIPID: Primary | ICD-10-CM

## 2025-06-27 DIAGNOSIS — F41.1 GAD (GENERALIZED ANXIETY DISORDER): ICD-10-CM

## 2025-06-27 DIAGNOSIS — R20.2 NUMBNESS AND TINGLING OF BOTH LOWER EXTREMITIES: ICD-10-CM

## 2025-06-27 DIAGNOSIS — E55.9 VITAMIN D DEFICIENCY: ICD-10-CM

## 2025-06-27 DIAGNOSIS — Z00.00 ROUTINE MEDICAL EXAM: ICD-10-CM

## 2025-06-27 DIAGNOSIS — R20.0 NUMBNESS AND TINGLING OF BOTH LOWER EXTREMITIES: ICD-10-CM

## 2025-06-27 PROCEDURE — 1036F TOBACCO NON-USER: CPT | Performed by: NURSE PRACTITIONER

## 2025-06-27 PROCEDURE — 99395 PREV VISIT EST AGE 18-39: CPT | Performed by: NURSE PRACTITIONER

## 2025-06-27 PROCEDURE — 99213 OFFICE O/P EST LOW 20 MIN: CPT | Performed by: NURSE PRACTITIONER

## 2025-06-27 ASSESSMENT — PATIENT HEALTH QUESTIONNAIRE - PHQ9
SUM OF ALL RESPONSES TO PHQ9 QUESTIONS 1 AND 2: 0
1. LITTLE INTEREST OR PLEASURE IN DOING THINGS: NOT AT ALL
2. FEELING DOWN, DEPRESSED OR HOPELESS: NOT AT ALL

## 2025-06-27 ASSESSMENT — ENCOUNTER SYMPTOMS
CONSTITUTIONAL NEGATIVE: 1
CONFUSION: 0
ACTIVITY CHANGE: 0
DIZZINESS: 0
RESPIRATORY NEGATIVE: 1
SHORTNESS OF BREATH: 0
ABDOMINAL PAIN: 0
WOUND: 0
DIARRHEA: 0
WHEEZING: 0
WEAKNESS: 0
FEVER: 0
CONSTIPATION: 0
VOMITING: 0
NERVOUS/ANXIOUS: 1
APNEA: 0
COUGH: 0
PALPITATIONS: 0
NAUSEA: 0
SLEEP DISTURBANCE: 0
HEADACHES: 0
CHILLS: 0
FATIGUE: 0

## 2025-06-27 NOTE — ASSESSMENT & PLAN NOTE
Health Maintenance Topics with due status: Overdue       Topic Date Due    Varicella Vaccines Never done    HPV Vaccines 08/14/2012    Lipid Panel 08/04/2024    COVID-19 Vaccine Never done     Health Maintenance Topics with due status: Due Soon       Topic Date Due    Yearly Adult Physical 08/27/2025     Health Maintenance Topics with due status: Not Due       Topic Last Completion Date    DTaP/Tdap/Td Vaccines 12/23/2021    Cervical Cancer Screening 07/28/2023    Influenza Vaccine 10/19/2023    Zoster Vaccines Not Due     Health Maintenance Topics with due status: Completed       Topic Last Completion Date    Hepatitis A Vaccines 05/22/2012    HIV Screening 09/14/2021    Hepatitis C Screening 08/04/2023    Hepatitis B Vaccines 10/19/2023     Health Maintenance Topics with due status: Aged Out       Topic Date Due    HIB Vaccines Aged Out    IPV Vaccines Aged Out    Meningococcal Vaccine Aged Out    Rotavirus Vaccines Aged Out    Pneumococcal Vaccine: Pediatrics and At-Risk Adult Patients Aged Out     Health Maintenance Topics with due status: Discontinued       Topic Date Due    MMR Vaccines Discontinued      80

## 2025-06-27 NOTE — PROGRESS NOTES
Subjective   Patient ID: Griselda Little is a 34 y.o. female who presents for New Patient Visit, Annual Exam, and Bilateral lower extremity numbness and tingling.    34-year-old female patient here to establish care.    History of anxiety.  Treated with over-the-counter supplement.  No increasing symptoms reported.  Denies suicidal ideations    Numbness and Tingling to lower extremities. Down to toes   Only during sleep at night   Admits to low water intake with         Review of Systems   Constitutional: Negative.  Negative for activity change, chills, fatigue and fever.   Respiratory: Negative.  Negative for apnea, cough, shortness of breath and wheezing.    Cardiovascular:  Negative for chest pain and palpitations.   Gastrointestinal:  Negative for abdominal pain, constipation, diarrhea, nausea and vomiting.   Skin:  Negative for rash and wound.   Neurological:  Negative for dizziness, weakness and headaches.   Psychiatric/Behavioral:  Negative for confusion, self-injury, sleep disturbance and suicidal ideas. The patient is nervous/anxious.         Manages with a natural herb supplement bought in store         Objective   BP 97/62   Pulse 79   Temp 36.2 °C (97.2 °F) (Temporal)   Wt 53.7 kg (118 lb 6.4 oz)   LMP 06/09/2025   SpO2 97%   BMI 21.66 kg/m²     Physical Exam  Vitals reviewed.   Constitutional:       Appearance: Normal appearance.   HENT:      Head: Normocephalic.   Cardiovascular:      Rate and Rhythm: Normal rate and regular rhythm.      Pulses: Normal pulses.      Heart sounds: Normal heart sounds.   Pulmonary:      Effort: Pulmonary effort is normal. No respiratory distress.      Breath sounds: Normal breath sounds. No wheezing.   Abdominal:      General: Bowel sounds are normal.   Neurological:      General: No focal deficit present.      Mental Status: She is alert and oriented to person, place, and time.   Psychiatric:         Mood and Affect: Mood normal.         Behavior: Behavior normal.          Assessment/Plan   Problem List Items Addressed This Visit           ICD-10-CM    BRODY (generalized anxiety disorder) F41.1    Well-controlled on over-the-counter anxiety supplement           Routine medical exam Z00.00    Health Maintenance Topics with due status: Overdue       Topic Date Due    Varicella Vaccines Never done    HPV Vaccines 08/14/2012    Lipid Panel 08/04/2024    COVID-19 Vaccine Never done     Health Maintenance Topics with due status: Due Soon       Topic Date Due    Yearly Adult Physical 08/27/2025     Health Maintenance Topics with due status: Not Due       Topic Last Completion Date    DTaP/Tdap/Td Vaccines 12/23/2021    Cervical Cancer Screening 07/28/2023    Influenza Vaccine 10/19/2023    Zoster Vaccines Not Due     Health Maintenance Topics with due status: Completed       Topic Last Completion Date    Hepatitis A Vaccines 05/22/2012    HIV Screening 09/14/2021    Hepatitis C Screening 08/04/2023    Hepatitis B Vaccines 10/19/2023     Health Maintenance Topics with due status: Aged Out       Topic Date Due    HIB Vaccines Aged Out    IPV Vaccines Aged Out    Meningococcal Vaccine Aged Out    Rotavirus Vaccines Aged Out    Pneumococcal Vaccine: Pediatrics and At-Risk Adult Patients Aged Out     Health Maintenance Topics with due status: Discontinued       Topic Date Due    MMR Vaccines Discontinued            Numbness and tingling of both lower extremities R20.0, R20.2    Recommend B complex daily  B12, B6 and magnesium level ordered  No swelling.         Relevant Orders    Comprehensive Metabolic Panel    TSH with reflex to Free T4 if abnormal    Magnesium    Vitamin B12    Vitamin B6    Iron deficiency anemia secondary to inadequate dietary iron intake D50.8    CBC with differential, iron indicis ordered         Relevant Orders    CBC and Auto Differential    Iron and TIBC    Ferritin     Other Visit Diagnoses         Codes      Screening, lipid    -  Primary Z13.220    Relevant  Orders    Lipid Panel      Vitamin D deficiency     E55.9    Relevant Orders    Vitamin D 25-Hydroxy,Total (for eval of Vitamin D levels)

## 2025-07-17 LAB
25(OH)D3+25(OH)D2 SERPL-MCNC: 31 NG/ML (ref 30–100)
ALBUMIN SERPL-MCNC: 4.7 G/DL (ref 3.6–5.1)
ALP SERPL-CCNC: 42 U/L (ref 31–125)
ALT SERPL-CCNC: 9 U/L (ref 6–29)
ANION GAP SERPL CALCULATED.4IONS-SCNC: 9 MMOL/L (CALC) (ref 7–17)
AST SERPL-CCNC: 16 U/L (ref 10–30)
BASOPHILS # BLD AUTO: 48 CELLS/UL (ref 0–200)
BASOPHILS NFR BLD AUTO: 1.2 %
BILIRUB SERPL-MCNC: 2.2 MG/DL (ref 0.2–1.2)
BUN SERPL-MCNC: 10 MG/DL (ref 7–25)
CALCIUM SERPL-MCNC: 9.5 MG/DL (ref 8.6–10.2)
CHLORIDE SERPL-SCNC: 103 MMOL/L (ref 98–110)
CHOLEST SERPL-MCNC: 152 MG/DL
CHOLEST/HDLC SERPL: 2.4 (CALC)
CO2 SERPL-SCNC: 25 MMOL/L (ref 20–32)
CREAT SERPL-MCNC: 0.65 MG/DL (ref 0.5–0.97)
EGFRCR SERPLBLD CKD-EPI 2021: 120 ML/MIN/1.73M2
EOSINOPHIL # BLD AUTO: 100 CELLS/UL (ref 15–500)
EOSINOPHIL NFR BLD AUTO: 2.5 %
ERYTHROCYTE [DISTWIDTH] IN BLOOD BY AUTOMATED COUNT: 11.6 % (ref 11–15)
FERRITIN SERPL-MCNC: 30 NG/ML (ref 16–154)
GLUCOSE SERPL-MCNC: 80 MG/DL (ref 65–99)
HCT VFR BLD AUTO: 38.7 % (ref 35–45)
HDLC SERPL-MCNC: 63 MG/DL
HGB BLD-MCNC: 12.4 G/DL (ref 11.7–15.5)
IRON SATN MFR SERPL: 17 % (CALC) (ref 16–45)
IRON SERPL-MCNC: 66 MCG/DL (ref 40–190)
LDLC SERPL CALC-MCNC: 80 MG/DL (CALC)
LYMPHOCYTES # BLD AUTO: 2220 CELLS/UL (ref 850–3900)
LYMPHOCYTES NFR BLD AUTO: 55.5 %
MAGNESIUM SERPL-MCNC: 2.2 MG/DL (ref 1.5–2.5)
MCH RBC QN AUTO: 29.2 PG (ref 27–33)
MCHC RBC AUTO-ENTMCNC: 32 G/DL (ref 32–36)
MCV RBC AUTO: 91.3 FL (ref 80–100)
MONOCYTES # BLD AUTO: 280 CELLS/UL (ref 200–950)
MONOCYTES NFR BLD AUTO: 7 %
NEUTROPHILS # BLD AUTO: 1352 CELLS/UL (ref 1500–7800)
NEUTROPHILS NFR BLD AUTO: 33.8 %
NONHDLC SERPL-MCNC: 89 MG/DL (CALC)
PLATELET # BLD AUTO: 252 THOUSAND/UL (ref 140–400)
PMV BLD REES-ECKER: 10.4 FL (ref 7.5–12.5)
POTASSIUM SERPL-SCNC: 4.4 MMOL/L (ref 3.5–5.3)
PROT SERPL-MCNC: 7.1 G/DL (ref 6.1–8.1)
RBC # BLD AUTO: 4.24 MILLION/UL (ref 3.8–5.1)
SODIUM SERPL-SCNC: 137 MMOL/L (ref 135–146)
TIBC SERPL-MCNC: 378 MCG/DL (CALC) (ref 250–450)
TRIGL SERPL-MCNC: 32 MG/DL
TSH SERPL-ACNC: 0.85 MIU/L
WBC # BLD AUTO: 4 THOUSAND/UL (ref 3.8–10.8)

## 2025-07-18 ENCOUNTER — RESULTS FOLLOW-UP (OUTPATIENT)
Dept: PRIMARY CARE | Facility: CLINIC | Age: 35
End: 2025-07-18
Payer: COMMERCIAL

## 2025-09-12 ENCOUNTER — APPOINTMENT (OUTPATIENT)
Dept: OBSTETRICS AND GYNECOLOGY | Facility: CLINIC | Age: 35
End: 2025-09-12
Payer: COMMERCIAL

## 2026-06-30 ENCOUNTER — APPOINTMENT (OUTPATIENT)
Dept: PRIMARY CARE | Facility: CLINIC | Age: 36
End: 2026-06-30
Payer: COMMERCIAL